# Patient Record
Sex: FEMALE | Race: BLACK OR AFRICAN AMERICAN | Employment: FULL TIME | URBAN - METROPOLITAN AREA
[De-identification: names, ages, dates, MRNs, and addresses within clinical notes are randomized per-mention and may not be internally consistent; named-entity substitution may affect disease eponyms.]

---

## 2020-09-16 ENCOUNTER — HOSPITAL ENCOUNTER (EMERGENCY)
Facility: HOSPITAL | Age: 40
Discharge: HOME | End: 2020-09-17
Attending: EMERGENCY MEDICINE
Payer: COMMERCIAL
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
## 2020-09-16 DIAGNOSIS — S61.451A DOG BITE, HAND, RIGHT, INITIAL ENCOUNTER: Primary | ICD-10-CM

## 2020-09-16 PROCEDURE — 99283 EMERGENCY DEPT VISIT LOW MDM: CPT

## 2020-09-16 RX ORDER — AMOXICILLIN AND CLAVULANATE POTASSIUM 875; 125 MG/1; MG/1
1 TABLET, FILM COATED ORAL ONCE
Status: COMPLETED | OUTPATIENT
Start: 2020-09-16 | End: 2020-09-16

## 2020-09-16 RX ORDER — TRAMADOL HYDROCHLORIDE 50 MG/1
50 TABLET ORAL ONCE
Status: COMPLETED | OUTPATIENT
Start: 2020-09-16 | End: 2020-09-16

## 2020-09-16 RX ORDER — TRAMADOL HYDROCHLORIDE 50 MG/1
50 TABLET ORAL EVERY 12 HOURS PRN
Qty: 6 TABLET | Refills: 0 | Status: SHIPPED | OUTPATIENT
Start: 2020-09-16 | End: 2020-09-19

## 2020-09-16 RX ORDER — AMOXICILLIN AND CLAVULANATE POTASSIUM 875; 125 MG/1; MG/1
1 TABLET, FILM COATED ORAL 2 TIMES DAILY
Qty: 20 TABLET | Refills: 0 | Status: SHIPPED | OUTPATIENT
Start: 2020-09-16 | End: 2020-09-26

## 2020-09-17 VITALS
RESPIRATION RATE: 18 BRPM | SYSTOLIC BLOOD PRESSURE: 130 MMHG | HEART RATE: 81 BPM | HEIGHT: 62 IN | OXYGEN SATURATION: 100 % | DIASTOLIC BLOOD PRESSURE: 86 MMHG | WEIGHT: 160 LBS | BODY MASS INDEX: 29.44 KG/M2

## 2020-09-17 NOTE — ED PROVIDER NOTES
Patient Seen in: [facility] AND [facility] Emergency Department    History   Patient presents with:  Bite    Stated Complaint: Bite      HPI    29-year-old female without past medical history presenting for evaluation of dog bite to right thumb sustained from lora with full/painless range of motion to MCP/IP joints. Skin: Skin is warm. Psychiatric: Cooperative. Nursing note and vitals reviewed.         ED Course   Labs Reviewed - No data to display         MDM     DIFFERENTIAL DIAGNOSIS: After history and physica

## 2020-09-17 NOTE — ED INITIAL ASSESSMENT (HPI)
Patient got bit by her dog at home. Bite to left thumb, puncture wound noted. Bleeding controlled, band aid in place PTA. Believes tetanus is UTD w/i last 10 years.

## 2024-10-23 ENCOUNTER — HOSPITAL ENCOUNTER (EMERGENCY)
Facility: HOSPITAL | Age: 44
Discharge: HOME | End: 2024-10-23
Attending: EMERGENCY MEDICINE
Payer: COMMERCIAL

## 2024-10-23 VITALS
HEART RATE: 85 BPM | HEIGHT: 62 IN | SYSTOLIC BLOOD PRESSURE: 128 MMHG | TEMPERATURE: 98 F | DIASTOLIC BLOOD PRESSURE: 75 MMHG | BODY MASS INDEX: 34.96 KG/M2 | OXYGEN SATURATION: 100 % | RESPIRATION RATE: 17 BRPM | WEIGHT: 190 LBS

## 2024-10-23 DIAGNOSIS — N61.0 CELLULITIS OF LEFT BREAST: Primary | ICD-10-CM

## 2024-10-23 PROCEDURE — 99283 EMERGENCY DEPT VISIT LOW MDM: CPT

## 2024-10-23 PROCEDURE — 99284 EMERGENCY DEPT VISIT MOD MDM: CPT

## 2024-10-23 RX ORDER — DOXYCYCLINE 100 MG/1
100 CAPSULE ORAL 2 TIMES DAILY
Qty: 14 CAPSULE | Refills: 0 | Status: SHIPPED | OUTPATIENT
Start: 2024-10-23 | End: 2024-10-30

## 2024-10-23 RX ORDER — DOXYCYCLINE 100 MG/1
100 CAPSULE ORAL ONCE
Status: COMPLETED | OUTPATIENT
Start: 2024-10-23 | End: 2024-10-23

## 2024-10-23 RX ORDER — IBUPROFEN 600 MG/1
600 TABLET, FILM COATED ORAL ONCE
Status: COMPLETED | OUTPATIENT
Start: 2024-10-23 | End: 2024-10-23

## 2024-10-24 ENCOUNTER — PATIENT OUTREACH (OUTPATIENT)
Dept: CASE MANAGEMENT | Age: 44
End: 2024-10-24

## 2024-10-24 NOTE — PROGRESS NOTES
ED follow up.    Alanna Garber MD  Breast Surgery; Breast Surgical Oncology  [facility]  [address] [place] [address]  [phone]  Office will contact the patient.    Confirmed with patient.    Closing encounter.

## 2024-10-24 NOTE — ED PROVIDER NOTES
Patient Seen in: [facility] Emergency Department    History     Chief Complaint   Patient presents with    Lump Mass       HPI    44 year old here with Left breast pain since 2 days ago with redness associated. No nipple discharge. No fevers. Mammogram last year was reportedly normal according to patient.     History reviewed. History reviewed. No pertinent past medical history.    History reviewed. History reviewed. No pertinent surgical history.      Medications :  Prescriptions Prior to Admission[1]     History reviewed. No pertinent family history.    Smoking Status:   Social History     Socioeconomic History    Marital status:    Tobacco Use    Smoking status: Every Day     Types: Cigarettes    Smokeless tobacco: Never    Tobacco comments:     6 cigarettes a day   Vaping Use    Vaping status: Never Used   Substance and Sexual Activity    Alcohol use: Not Currently    Drug use: Never       Constitutional and vital signs reviewed.      Social History and Family History elements reviewed from today, pertinent positives to the presenting problem noted.    Physical Exam     ED Triage Vitals [10/23/24 2051]   /81   Pulse 97   Resp 17   Temp 97.9 °F (36.6 °C)   Temp src    SpO2 99 %   O2 Device None (Room air)       All measures to prevent infection transmission during my interaction with the patient were taken. The patient was already wearing a droplet mask on my arrival to the room. Personal protective equipment was worn throughout the duration of the exam.  Handwashing was performed prior to and after the exam.  Stethoscope and any equipment used during my examination was cleaned with super sani-cloth germicidal wipes following the exam.     Physical Exam    General: NAD  Head: Normocephalic and atraumatic.  Mouth/Throat/Ears/Nose: Oropharynx is clear and moist.   Eyes: Conjunctivae and EOM are normal.   Neck: Normal range of motion. Supple.   Cardiovascular: Normal rate, regular rhythm,  normal heart sounds.  Respiratory/Chest: Clear and equal bilaterally. Left breast 5 oclock tenderness with induration approximately 3 cm wide without fluctuance. No nipple discharge. No axillary LAD.   Gastrointestinal: Soft, non-tender, non-distended. Bowel sounds are normal.   Musculoskeletal:No swelling or deformity.   Neurological: Alert and appropriate. No focal deficits.   Skin: Skin is warm and dry. No pallor.  Psychiatric: Has a normal mood and affect.;    ED Course      Labs Reviewed - No data to display    As Interpreted by me    Imaging Results Available and Reviewed while in ED: No results found.  ED Medications Administered:   Medications   ibuprofen (Motrin) tab 600 mg (600 mg Oral Given 10/23/24 2218)   doxycycline (Vibramycin) cap 100 mg (100 mg Oral Given 10/23/24 2218)         MDM     Vitals:    10/23/24 2051 10/23/24 2219   BP: 129/81 128/75   Pulse: 97 85   Resp: 17 17   Temp: 97.9 °F (36.6 °C)    SpO2: 99% 100%   Weight: 86.2 kg    Height: 157.5 cm (5' 2\")      *I personally reviewed and interpreted all ED vitals.    Pulse Ox: 99%, Room air, Normal          Medical Decision Making      Differential Diagnosis/ Diagnostic Considerations: breast cellulitis, mastitis, abscess, malignancy     Complicating Factors: The patient already has does not have a problem list on file. to contribute to the complexity of this ED evaluation.    I reviewed prior chart records including telephone communication note from today prompting referral to the emergency department.  Under point-of-care ultrasound, multiple septations within cystlike matter noted however no confluent pocket of fluid amenable to ED bedside drainage.  I did discuss with case management to expedite breast surgical follow-up.  Antibiotics prescribed, warm compress recommended as well.    Dc In stable condition.  Patient is comfortable with the plan.      Prescriptions:as above and ibuprofen recommended       Disposition and Plan     Clinical  Impression:  1. Cellulitis of left breast        Disposition:  Discharge    Follow-up:  Alanna Garber MD  [address] [facility] [id number] [phone]    Schedule an appointment as soon as possible for a visit in 1 day(s)        Medications Prescribed:  Discharge Medication List as of 10/23/2024 10:20 PM        START taking these medications    Details   doxycycline 100 MG Oral Cap Take 1 capsule (100 mg total) by mouth 2 (two) times daily for 7 days., Normal, Disp-14 capsule, R-0                              [1] (Not in a hospital admission)

## 2024-10-24 NOTE — CM/SW NOTE
Received a call from Dr. DEBBIE Arguello requesting assistance to get patient an appointment with breast surgeon. Per Dr. DEBBIE Arguello order, patient to schedule appointment with Dr. Alanna Garber.            Post DC order placed and will endorse to AM EDCM. .

## 2024-10-24 NOTE — CM/SW NOTE
Per SEBASTIAN, called Dr. Alanna Garber's office, Surgical oncologist at [phone] and spoke with Kristyn regarding request for an expedited appointment.    Kristyn stated that they will call the patient directly and schedule an appointment.

## 2024-10-24 NOTE — DISCHARGE INSTRUCTIONS
Please perform warm compress as we discussed.  Start the antibiotics immediately and return if any worsening symptoms.

## 2024-10-28 ENCOUNTER — OFFICE VISIT (OUTPATIENT)
Dept: SURGERY | Facility: CLINIC | Age: 44
End: 2024-10-28
Payer: COMMERCIAL

## 2024-10-28 VITALS
HEART RATE: 94 BPM | OXYGEN SATURATION: 90 % | RESPIRATION RATE: 22 BRPM | TEMPERATURE: 96 F | WEIGHT: 191.38 LBS | SYSTOLIC BLOOD PRESSURE: 135 MMHG | DIASTOLIC BLOOD PRESSURE: 82 MMHG | BODY MASS INDEX: 35 KG/M2

## 2024-10-28 DIAGNOSIS — N64.4 BREAST PAIN, LEFT: Primary | ICD-10-CM

## 2024-10-28 PROCEDURE — 99203 OFFICE O/P NEW LOW 30 MIN: CPT

## 2024-10-29 NOTE — PROGRESS NOTES
Breast Surgery New Patient Consultation    This is the first visit for this 44 year old woman, referred by [facility], who presents for evaluation of left breast cellulitis.    History of Present Illness:   Ms. Maile Norris is a 44 year old woman who presents with left breast cellulitis.  She was having pain and redness to her left breast with an associated lump at the 5 o'clock position.  She presented to the ER where they did an ultrasound and was not found to have any drainable fluid collections.  She denies any nipple discharge, skin changes, or axillary adenopathy. She does have breast pain. She does not have significant past history for breast diseases or breast biopsy. She does not have family history of breast cancer. She has been started on oral antibiotics and has noticed the redness to her breast has improved. She is here today for evaluation and recommendations for further therapy.        History reviewed. No pertinent past medical history.    History reviewed. No pertinent surgical history.    Gynecological History:  Pt is a   Pt was 19 years old at time of first pregnancy.    She has cumulative breastfeeding history of 5 months.  She achieved menarche at age 11 and LMP 10/5/2024  She denies any history of hormone replacement therapy   She denies any history of oral contraceptive use   She denies infertility treatment to achieve pregnancy.    Medications:    No outpatient medications have been marked as taking for the 10/28/24 encounter (Office Visit) with Lily Tomas APRN.       Allergies:    Allergies[1]    Family History:   History reviewed. No pertinent family history.    She is not of Ashkenazi Spiritism ancestry.    Social History:  History   Alcohol Use Not Currently       History   Smoking Status    Every Day    Types: Cigarettes   Smokeless Tobacco    Never     Comment: 6 cigarettes a day     Ms. Maile Norris is  with 3 children. She has 4 siblings. She is currently Employed  full-time    Review of Systems:  General:   The patient denies, fever, chills, night sweats, fatigue, generalized weakness, change in appetite or weight loss.    HEENT:     The patient denies eye irritation, cataracts, redness, glaucoma, yellowing of the eyes, change in vision, color blindness, or +wearing contacts/glasses. The patient denies hearing loss, ringing in the ears, ear drainage, earaches, nasal congestion, nose bleeds, snoring, pain in mouth/throat, hoarseness, change in voice, facial trauma.    Respiratory:  The patient denies chronic cough, phlegm, hemoptysis, pleurisy/chest pain, pneumonia, asthma, wheezing, difficulty in breathing with exertion, emphysema, chronic bronchitis, shortness of breath or abnormal sound when breathing.     Cardiovascular:  There is no history of chest pain, chest pressure/discomfort, palpitations, irregular heartbeat, fainting or near-fainting, difficulty breathing when lying flat, SOB/Coughing at night, swelling of the legs or chest pain while walking.    Breasts:  See history of present illness    Gastrointestinal:     There is no history of difficulty or pain with swallowing, reflux symptoms, vomiting, dark or bloody stools, constipation, yellowing of the skin, indigestion, nausea, change in bowel habits, diarrhea, abdominal pain or vomiting blood.     Genitourinary:  The patient denies frequent urination, needing to get up at night to urinate, urinary hesitancy or retaining urine, painful urination, urinary incontinence, decreased urine stream, blood in the urine or vaginal/penile discharge.    Skin:    The patient denies rash, itching, skin lesions, dry skin, change in skin color or change in moles.     Hematologic/Lymphatic:  The patient denies easily bruising or bleeding or persistent swollen glands or lymph nodes.     Musculoskeletal:  The patient denies muscle aches/pain, joint pain, stiff joints, neck pain, back pain or bone pain.    Neuropsychiatric:  There is no  history of migraines or severe headaches, seizure/epilepsy, speech problems, coordination problems, trembling/tremors, fainting/black outs, dizziness, memory problems, loss of sensation/numbness, problems walking, weakness, tingling or burning in hands/feet. There is no history of abusive relationship, bipolar disorder, sleep disturbance, anxiety, depression or feeling of despair.    Endocrine:    There is no history of poor/slow wound healing, weight loss/gain, fertility or hormone problems, cold intolerance, thyroid disease.     Allergic/Immunologic:  There is no history of hives, hay fever, angioedema or anaphylaxis.    /82 (BP Location: Right arm, Patient Position: Sitting, Cuff Size: adult)   Pulse 94   Temp (!) 96.1 °F (35.6 °C) (Tympanic)   Resp 22   Wt 86.8 kg (191 lb 6.4 oz)   LMP 10/04/2024 (Approximate)   SpO2 90%   BMI 35.01 kg/m²     Physical Exam:  The patient is an alert, oriented, well-nourished and  well-developed woman who appears her stated age. Her speech patterns and movements are normal. Her affect is appropriate.    HEENT: The head is normocephalic. The neck is supple. The thyroid is not enlarged and is without palpable masses/nodules. There are no palpable masses. The trachea is in the midline. Conjunctiva are clear, non-icteric.    Chest: The chest expands symmetrically. The lungs are clear to auscultation.    Heart: The rhythm is regular.  There are no murmurs, rubs, gallops or thrills.    Breasts:  Her breasts are symmetrical with a cup size 36C.  Right breast: The skin, nipple ,and areola appear normal. There is no skin dimpling with movement of the pectoralis. There is no nipple retraction. No nipple discharge can be elicited. The parenchyma is mildly nodular. There are no dominant masses in the breast. The axillary tail is normal.  Left breast:   The skin, nipple, and areola appear normal. There is no skin dimpling with movement of the pectoralis. There is no nipple  retraction. No nipple discharge can be elicited. The parenchyma is mildly nodular. There are no dominant masses in the breast. The axillary tail is normal.    Abdomen:  The abdomen is soft, flat and non tender. The liver is not enlarged. There are no palpable masses.    Lymph Nodes:  The supraclavicular, axillary and cervical regions are free of significant lymphadenopathy.    Back: There is no vertebral column tenderness.    Skin: The skin appears normal. There are no suspicious appearing rashes or lesions.    Extremities: The extremities are without deformity, cyanosis or edema.    Impression:   Ms. Maile Norris is a 44 year old woman presents with improving left breast cellulitis.       Discussion and Plan:  I had a discussion with the Patient regarding her breast exam. On exam today I found slight erythema to the left breast.  She should continue her oral antibiotics.  There is no drainable fluid at this time.  I do recommend patient proceeds with bilateral diagnostic mammogram once she has finished her oral antibiotic course.  I will be in touch with her with the results of her mammogram once they become available.  She was given ample opportunity for questions and those questions were answered to her satisfaction. She has been  encouraged to contact the office with any questions or concerns prior to her next appointment.     This encounter lasted a total of 30 minutes, more than 50% of which was dedicated to the discussion of management options.         This note was created by [employer] voice recognition. Errors in content may be related to improper recognition by the system; efforts to review and correct have been done but errors may still exist. Please be advised the primary purpose of this note is for me to communicate medical care. Standard sentence structure is not always used. Medical terminology and medical abbreviations may be used. There may be grammatical, typographical, and automated fill ins that may  have errors missed in proofreading.           [1] No Known Allergies